# Patient Record
Sex: FEMALE | Race: WHITE | NOT HISPANIC OR LATINO | Employment: UNEMPLOYED | URBAN - METROPOLITAN AREA
[De-identification: names, ages, dates, MRNs, and addresses within clinical notes are randomized per-mention and may not be internally consistent; named-entity substitution may affect disease eponyms.]

---

## 2024-09-26 ENCOUNTER — OFFICE VISIT (OUTPATIENT)
Dept: FAMILY MEDICINE CLINIC | Facility: CLINIC | Age: 59
End: 2024-09-26
Payer: COMMERCIAL

## 2024-09-26 VITALS
OXYGEN SATURATION: 97 % | HEART RATE: 94 BPM | BODY MASS INDEX: 27.84 KG/M2 | SYSTOLIC BLOOD PRESSURE: 140 MMHG | WEIGHT: 177.4 LBS | HEIGHT: 67 IN | DIASTOLIC BLOOD PRESSURE: 82 MMHG | TEMPERATURE: 98.6 F

## 2024-09-26 DIAGNOSIS — D36.7 DERMOID CYST OF NOSE: ICD-10-CM

## 2024-09-26 DIAGNOSIS — I34.1 MVP (MITRAL VALVE PROLAPSE): ICD-10-CM

## 2024-09-26 DIAGNOSIS — Z12.11 SCREENING FOR MALIGNANT NEOPLASM OF COLON: ICD-10-CM

## 2024-09-26 DIAGNOSIS — D22.9 NEVUS: ICD-10-CM

## 2024-09-26 DIAGNOSIS — R76.8 POSITIVE ANA (ANTINUCLEAR ANTIBODY): ICD-10-CM

## 2024-09-26 DIAGNOSIS — R73.03 PREDIABETES: ICD-10-CM

## 2024-09-26 DIAGNOSIS — Z11.59 NEED FOR HEPATITIS C SCREENING TEST: ICD-10-CM

## 2024-09-26 DIAGNOSIS — Z12.4 SCREENING FOR CERVICAL CANCER: ICD-10-CM

## 2024-09-26 DIAGNOSIS — Z00.00 ENCOUNTER FOR ANNUAL PHYSICAL EXAM: Primary | ICD-10-CM

## 2024-09-26 DIAGNOSIS — E78.00 HYPERCHOLESTEREMIA: ICD-10-CM

## 2024-09-26 DIAGNOSIS — H04.129 DRY EYE: ICD-10-CM

## 2024-09-26 DIAGNOSIS — Z12.31 ENCOUNTER FOR SCREENING MAMMOGRAM FOR MALIGNANT NEOPLASM OF BREAST: ICD-10-CM

## 2024-09-26 PROBLEM — R68.2 DRY MOUTH: Status: ACTIVE | Noted: 2021-11-12

## 2024-09-26 PROBLEM — N96 HISTORY OF MULTIPLE MISCARRIAGES: Status: ACTIVE | Noted: 2024-09-26

## 2024-09-26 PROCEDURE — 99214 OFFICE O/P EST MOD 30 MIN: CPT | Performed by: FAMILY MEDICINE

## 2024-09-26 PROCEDURE — 99386 PREV VISIT NEW AGE 40-64: CPT | Performed by: FAMILY MEDICINE

## 2024-09-26 RX ORDER — IBUPROFEN 200 MG
200 TABLET ORAL EVERY 6 HOURS PRN
COMMUNITY

## 2024-09-26 RX ORDER — MULTIPLE VITAMINS W/ MINERALS TAB 9MG-400MCG
1 TAB ORAL DAILY
COMMUNITY

## 2024-09-26 RX ORDER — CALCIUM CARBONATE 750 MG/1
750 TABLET, CHEWABLE ORAL DAILY
COMMUNITY

## 2024-10-10 ENCOUNTER — HOSPITAL ENCOUNTER (OUTPATIENT)
Dept: MAMMOGRAPHY | Facility: HOSPITAL | Age: 59
Discharge: HOME | End: 2024-10-10
Admitting: FAMILY MEDICINE
Payer: COMMERCIAL

## 2024-10-10 DIAGNOSIS — Z12.31 ENCOUNTER FOR SCREENING MAMMOGRAM FOR MALIGNANT NEOPLASM OF BREAST: ICD-10-CM

## 2024-10-10 PROCEDURE — 77063 BREAST TOMOSYNTHESIS BI: CPT

## 2024-10-10 PROCEDURE — 77067 SCR MAMMO BI INCL CAD: CPT

## 2024-11-12 ENCOUNTER — OFFICE VISIT (OUTPATIENT)
Dept: CARDIOLOGY | Facility: CLINIC | Age: 59
End: 2024-11-12
Payer: COMMERCIAL

## 2024-11-12 VITALS
HEART RATE: 74 BPM | WEIGHT: 179 LBS | OXYGEN SATURATION: 96 % | DIASTOLIC BLOOD PRESSURE: 74 MMHG | HEIGHT: 67 IN | BODY MASS INDEX: 28.09 KG/M2 | SYSTOLIC BLOOD PRESSURE: 128 MMHG

## 2024-11-12 DIAGNOSIS — R00.2 PALPITATIONS: ICD-10-CM

## 2024-11-12 DIAGNOSIS — I34.1 MVP (MITRAL VALVE PROLAPSE): Primary | ICD-10-CM

## 2024-11-12 DIAGNOSIS — R42 DIZZINESS: ICD-10-CM

## 2024-11-12 PROCEDURE — 99204 OFFICE O/P NEW MOD 45 MIN: CPT | Performed by: INTERNAL MEDICINE

## 2024-11-12 PROCEDURE — 93000 ELECTROCARDIOGRAM COMPLETE: CPT | Performed by: INTERNAL MEDICINE

## 2024-11-12 NOTE — PROGRESS NOTES
[facility] Cardiology  Office Note  Montse Knight  1965  [address] [facility] [address]     VISIT DATE:  11/12/24    PCP: Cecy Cooper DO  [address] [address]    CC: Palpitations  Chief Complaint   Patient presents with    [facility]     Mitral Valve Prolapse       PROBLEM LIST:    Palpitations  Dizziness  Mitral valve prolapse    Allergies  Allergies   Allergen Reactions    Erythromycin Nausea And Vomiting and Other (See Comments)     Stomach pain    Sulfa Antibiotics Hives       Current Medications    Current Outpatient Medications:     calcium carbonate EX (TUMS EX) 750 MG chewable tablet, Chew 1 tablet Daily., Disp: , Rfl:     ibuprofen (ADVIL,MOTRIN) 200 MG tablet, Take 1 tablet by mouth Every 6 (Six) Hours As Needed for Mild Pain., Disp: , Rfl:     multivitamin with minerals tablet tablet, Take 1 tablet by mouth Daily., Disp: , Rfl:      History of Present Illness   Montse Knight is a 59 y.o. year old female who presents for consult from Cecy Cooper DO for evaluation of dizziness.  Patient states been an issue for her for multiple years.  Patient states she was told she had mitral valve prolapse when she was younger.  Patient denies any syncope however does have chronic dizziness and lightheadedness.  Patient states that she has been concerned that she has POTS.  Patient denies any real chest pain pressure discomfort.  No significant shortness of breath.  Does have an erratic heart rate/beats she states.  Can hear this in her ears at times.  Patient denies having underwent an echocardiogram for multiple years.  Patient otherwise is quite active overall.  Patient's blood pressure tends to be controlled when she does check such at home.    Pt denies any chest pain, dyspnea at rest, dyspnea on exertion, orthopnea, PND, palpitations, lower extremity edema, or claudication. Pt denies history of CHF, DVT, PE, MI, CVA,  "TIA, or rheumatic fever.     SOCIAL HX  Social History     Socioeconomic History    Marital status:    Tobacco Use    Smoking status: Never     Passive exposure: Never    Smokeless tobacco: Never   Vaping Use    Vaping status: Never Used    Passive vaping exposure: Yes   Substance and Sexual Activity    Alcohol use: Never    Drug use: Never    Sexual activity: Yes     Partners: Male       FAMILY HX  Family History   Problem Relation Age of Onset    Asthma Father     Heart attack Father     Heart disease Father     Hyperlipidemia Father     Hypertension Father     Breast cancer Neg Hx     Ovarian cancer Neg Hx        Vitals:    11/12/24 1040   BP: 128/74   BP Location: Left arm   Patient Position: Sitting   Cuff Size: Adult   Pulse: 74   SpO2: 96%   Weight: 81.2 kg (179 lb)   Height: 170.2 cm (67\")     Body mass index is 28.04 kg/m².     PHYSICAL EXAMINATION:  Vitals and nursing note reviewed.   Constitutional:       Appearance: Healthy appearance. Not in distress.   Eyes:      Conjunctiva/sclera: Conjunctivae normal.      Pupils: Pupils are equal, round, and reactive to light.   HENT:      Nose: Nose normal.    Mouth/Throat:      Pharynx: Oropharynx is clear.   Neck:      Vascular: JVD normal.   Pulmonary:      Effort: Pulmonary effort is normal.      Breath sounds: Normal breath sounds. No wheezing. No rhonchi. No rales.   Cardiovascular:      PMI at left midclavicular line. Normal rate. Regular rhythm. Normal S1. Normal S2.       Murmurs: There is no murmur.      No gallop.  No click. No rub.   Pulses:     Intact distal pulses.   Abdominal:      General: Bowel sounds are normal.      Palpations: Abdomen is soft.      Tenderness: There is no abdominal tenderness.   Musculoskeletal: Normal range of motion.         General: No tenderness.      Cervical back: Normal range of motion. Skin:     General: Skin is warm and dry.   Neurological:      General: No focal deficit present.      Mental Status: Alert and " "oriented to person, place and time.      Cranial Nerves: Cranial nerves 2-12 are intact.         Diagnostic Data:  No results found for: \"CHLPL\", \"TRIG\", \"HDL\", \"LDLDIRECT\"  No results found for: \"GLUCOSE\", \"BUN\", \"CREATININE\", \"NA\", \"K\", \"CL\", \"CO2\"  No results found for: \"HGBA1C\"  No results found for: \"WBC\", \"HGB\", \"HCT\", \"PLT\"      ECG 12 Lead    Date/Time: 11/12/2024 5:19 PM  Performed by: Steve Hidalgo MD    Authorized by: Steve Hidalgo MD  Comparison: not compared with previous ECG   Rhythm: sinus rhythm  Rate: normal  ST Segments: ST segments normal  T Waves: T waves normal  QRS axis: normal  Other: no other findings    Clinical impression: normal ECG          Advance Care Planning   ACP discussion was declined by the patient. Patient does not have an advance directive, declines further assistance.         ASSESSMENT:  Diagnoses and all orders for this visit:    1. MVP (mitral valve prolapse) (Primary)  -     Adult Transthoracic Echo Complete W/ Cont if Necessary Per Protocol; Future  -     Holter Monitor - 72 Hour Up To 15 Days; Future    2. Dizziness    3. Palpitations        PLAN:    Patient will undergo echocardiogram for further evaluation of her valvular abnormalities by history.  Appreciable murmur today not significant  For patient's dizziness and palpitations undergo a 14-day patch monitor for further evaluation at this time  Discussed salt increase/compression stockings/increase fluid intake  Will see back after the above-mentioned testing    Return in about 3 months (around 2/12/2025).     Steve Hidalgo MD   "

## 2024-12-03 ENCOUNTER — HOSPITAL ENCOUNTER (OUTPATIENT)
Dept: CARDIOLOGY | Facility: HOSPITAL | Age: 59
Discharge: HOME | End: 2024-12-03
Admitting: INTERNAL MEDICINE
Payer: COMMERCIAL

## 2024-12-03 ENCOUNTER — TELEPHONE (OUTPATIENT)
Dept: CARDIOLOGY | Facility: CLINIC | Age: 59
End: 2024-12-03

## 2024-12-03 VITALS — BODY MASS INDEX: 28.1 KG/M2 | HEIGHT: 67 IN | WEIGHT: 179.01 LBS

## 2024-12-03 DIAGNOSIS — I34.1 MVP (MITRAL VALVE PROLAPSE): ICD-10-CM

## 2024-12-03 LAB
BH CV ECHO MEAS - AO MAX PG: 8.2 MMHG
BH CV ECHO MEAS - AO MEAN PG: 5 MMHG
BH CV ECHO MEAS - AO ROOT DIAM: 2.7 CM
BH CV ECHO MEAS - AO V2 MAX: 143 CM/SEC
BH CV ECHO MEAS - AO V2 VTI: 29.3 CM
BH CV ECHO MEAS - AVA(I,D): 2.18 CM2
BH CV ECHO MEAS - EDV(CUBED): 85.2 ML
BH CV ECHO MEAS - EDV(MOD-SP2): 79.6 ML
BH CV ECHO MEAS - EDV(MOD-SP4): 78.6 ML
BH CV ECHO MEAS - EF(MOD-BP): 58.6 %
BH CV ECHO MEAS - EF(MOD-SP2): 59.7 %
BH CV ECHO MEAS - EF(MOD-SP4): 56 %
BH CV ECHO MEAS - ESV(CUBED): 24.4 ML
BH CV ECHO MEAS - ESV(MOD-SP2): 32.1 ML
BH CV ECHO MEAS - ESV(MOD-SP4): 34.6 ML
BH CV ECHO MEAS - FS: 34.1 %
BH CV ECHO MEAS - IVS/LVPW: 1 CM
BH CV ECHO MEAS - IVSD: 0.8 CM
BH CV ECHO MEAS - LA DIMENSION: 3.6 CM
BH CV ECHO MEAS - LAT PEAK E' VEL: 16.2 CM/SEC
BH CV ECHO MEAS - LV DIASTOLIC VOL/BSA (35-75): 40.7 CM2
BH CV ECHO MEAS - LV MASS(C)D: 109.4 GRAMS
BH CV ECHO MEAS - LV MAX PG: 4.1 MMHG
BH CV ECHO MEAS - LV MEAN PG: 2 MMHG
BH CV ECHO MEAS - LV SYSTOLIC VOL/BSA (12-30): 17.9 CM2
BH CV ECHO MEAS - LV V1 MAX: 101 CM/SEC
BH CV ECHO MEAS - LV V1 VTI: 20.3 CM
BH CV ECHO MEAS - LVIDD: 4.4 CM
BH CV ECHO MEAS - LVIDS: 2.9 CM
BH CV ECHO MEAS - LVOT AREA: 3.1 CM2
BH CV ECHO MEAS - LVOT DIAM: 2 CM
BH CV ECHO MEAS - LVPWD: 0.8 CM
BH CV ECHO MEAS - MED PEAK E' VEL: 7.5 CM/SEC
BH CV ECHO MEAS - MV A MAX VEL: 74.4 CM/SEC
BH CV ECHO MEAS - MV DEC SLOPE: 376 CM/SEC2
BH CV ECHO MEAS - MV DEC TIME: 0.18 SEC
BH CV ECHO MEAS - MV E MAX VEL: 70.2 CM/SEC
BH CV ECHO MEAS - MV E/A: 0.94
BH CV ECHO MEAS - MV MAX PG: 3.3 MMHG
BH CV ECHO MEAS - MV MEAN PG: 2 MMHG
BH CV ECHO MEAS - MV P1/2T: 79.5 MSEC
BH CV ECHO MEAS - MV V2 VTI: 18.2 CM
BH CV ECHO MEAS - MVA(P1/2T): 2.8 CM2
BH CV ECHO MEAS - MVA(VTI): 3.5 CM2
BH CV ECHO MEAS - PA ACC TIME: 0.15 SEC
BH CV ECHO MEAS - PI END-D VEL: 103 CM/SEC
BH CV ECHO MEAS - RAP SYSTOLE: 3 MMHG
BH CV ECHO MEAS - RVSP: 24 MMHG
BH CV ECHO MEAS - SV(LVOT): 63.8 ML
BH CV ECHO MEAS - SV(MOD-SP2): 47.5 ML
BH CV ECHO MEAS - SV(MOD-SP4): 44 ML
BH CV ECHO MEAS - SVI(LVOT): 33.1 ML/M2
BH CV ECHO MEAS - SVI(MOD-SP2): 24.6 ML/M2
BH CV ECHO MEAS - SVI(MOD-SP4): 22.8 ML/M2
BH CV ECHO MEAS - TAPSE (>1.6): 2.8 CM
BH CV ECHO MEAS - TR MAX PG: 20.6 MMHG
BH CV ECHO MEAS - TR MAX VEL: 225.7 CM/SEC
BH CV ECHO MEASUREMENTS AVERAGE E/E' RATIO: 5.92
BH CV XLRA - RV BASE: 3.2 CM
BH CV XLRA - RV LENGTH: 7.7 CM
BH CV XLRA - RV MID: 2.8 CM
BH CV XLRA - TDI S': 14 CM/SEC
LEFT ATRIUM VOLUME INDEX: 18.9 ML/M2

## 2024-12-03 PROCEDURE — 93306 TTE W/DOPPLER COMPLETE: CPT

## 2024-12-03 NOTE — TELEPHONE ENCOUNTER
Spoke to pt and informed her of echo results. She verbalized understanding and had no further questions.

## 2025-08-02 PROCEDURE — 87205 SMEAR GRAM STAIN: CPT | Performed by: PHYSICIAN ASSISTANT

## 2025-08-02 PROCEDURE — 87070 CULTURE OTHR SPECIMN AEROBIC: CPT | Performed by: PHYSICIAN ASSISTANT
